# Patient Record
Sex: MALE | Race: WHITE | Employment: FULL TIME | URBAN - METROPOLITAN AREA
[De-identification: names, ages, dates, MRNs, and addresses within clinical notes are randomized per-mention and may not be internally consistent; named-entity substitution may affect disease eponyms.]

---

## 2023-04-26 ENCOUNTER — OFFICE VISIT (OUTPATIENT)
Age: 43
End: 2023-04-26
Payer: COMMERCIAL

## 2023-04-26 VITALS
WEIGHT: 204 LBS | HEART RATE: 67 BPM | SYSTOLIC BLOOD PRESSURE: 144 MMHG | BODY MASS INDEX: 27.63 KG/M2 | OXYGEN SATURATION: 98 % | DIASTOLIC BLOOD PRESSURE: 86 MMHG | HEIGHT: 72 IN

## 2023-04-26 DIAGNOSIS — R07.9 CHEST PAIN, UNSPECIFIED TYPE: Primary | ICD-10-CM

## 2023-04-26 PROCEDURE — 93000 ELECTROCARDIOGRAM COMPLETE: CPT | Performed by: INTERNAL MEDICINE

## 2023-04-26 PROCEDURE — 99204 OFFICE O/P NEW MOD 45 MIN: CPT | Performed by: INTERNAL MEDICINE

## 2023-04-26 RX ORDER — ALPRAZOLAM 0.5 MG/1
0.5 TABLET ORAL 2 TIMES DAILY
COMMUNITY
Start: 2023-03-20

## 2023-04-26 RX ORDER — OMEPRAZOLE 20 MG/1
20 CAPSULE, DELAYED RELEASE ORAL DAILY
COMMUNITY
Start: 2023-02-13

## 2023-04-26 ASSESSMENT — PATIENT HEALTH QUESTIONNAIRE - PHQ9
SUM OF ALL RESPONSES TO PHQ QUESTIONS 1-9: 0
SUM OF ALL RESPONSES TO PHQ9 QUESTIONS 1 & 2: 0
2. FEELING DOWN, DEPRESSED OR HOPELESS: 0
1. LITTLE INTEREST OR PLEASURE IN DOING THINGS: 0
SUM OF ALL RESPONSES TO PHQ QUESTIONS 1-9: 0

## 2023-04-26 NOTE — PROGRESS NOTES
Chris Burnett    Chief Complaint   Patient presents with    New Patient     Self referred due to chest pains     Chest Pain     Center/Left chest sharp/pinching that radiates to left arm causing numbness and blurry vision on/off    Palpitations     Pounding palps on/off    Dizziness     Slight dizzy spells on/off       HPI    Chris Burnett is a 41 y.o.  gentleman, self-referred for persistent chest pain. You know this gentleman does not have diabetes no hypertension and no known family history in any degree family members. He does have heartburn for which she takes omeprazole as well as some neck and back issues and so he stays with what he describes as a lot of chest symptoms. She is not sure if it is related to his existing musculoskeletal and GI issues and now perhaps anxiety concerned due to its persistence and perhaps worsening and would like to ensure he is not having a heart problem. Feels sharp CP off/ on in center of chest, sometimes has felt like his \"whole chest is on fire. \" A/w numbness and tingling down his left arm. He is quite active and its not necessarily related to exertion so doesn't know what to make of it, keeps happening though for last several months. Past Medical History:   Diagnosis Date    Anxiety     Herniated cervical disc     multiple       History reviewed. No pertinent surgical history. Current Outpatient Medications   Medication Sig Dispense Refill    ALPRAZolam (XANAX) 0.5 MG tablet Take 1 tablet by mouth 2 times daily. omeprazole (PRILOSEC) 20 MG delayed release capsule Take 1 capsule by mouth daily       No current facility-administered medications for this visit.        No Known Allergies    Social History     Socioeconomic History    Marital status:      Spouse name: Not on file    Number of children: Not on file    Years of education: Not on file    Highest education level: Not on file   Occupational History    Not on file   Tobacco Use    Smoking

## 2023-04-26 NOTE — PROGRESS NOTES
Vic Priest presents today for   Chief Complaint   Patient presents with    New Patient     Self referred due to chest pains     Chest Pain     Center/Left chest sharp/pinching that radiates to left arm causing numbness and blurry vision on/off    Palpitations     Pounding palps on/off    Dizziness     Slight dizzy spells on/off       Vic Priest preferred language for health care discussion is english/other. Is someone accompanying this pt? no    Is the patient using any DME equipment during OV? no    Depression Screening:  Depression: Not at risk    PHQ-2 Score: 0        Learning Assessment:  Who is the primary learner? Patient    What is the preferred language for health care of the primary learner? ENGLISH    How does the primary learner prefer to learn new concepts? DEMONSTRATION    Answered By patient    Relationship to Learner SELF           Pt currently taking Anticoagulant therapy? no    Pt currently taking Antiplatelet therapy ? no      Coordination of Care:  1. Have you been to the ER, urgent care clinic since your last visit? Hospitalized since your last visit? no    2. Have you seen or consulted any other health care providers outside of the [address] [place] [address] since your last visit? Include any pap smears or colon screening.  no